# Patient Record
Sex: MALE | Race: WHITE | URBAN - METROPOLITAN AREA
[De-identification: names, ages, dates, MRNs, and addresses within clinical notes are randomized per-mention and may not be internally consistent; named-entity substitution may affect disease eponyms.]

---

## 2018-06-19 PROBLEM — 38341003 HYPERTENSION: Status: ACTIVE | Noted: 2018-06-19

## 2018-06-19 PROBLEM — 161701005 HISTORY OF RESPIRATOR DEPENDENCE: Status: ACTIVE | Noted: 2018-06-19

## 2018-06-19 PROBLEM — 22298006 MYOCARDIAL INFARCTION: Status: ACTIVE | Noted: 2018-06-19

## 2018-06-19 PROBLEM — 13645005 CHRONIC OBSTRUCTIVE PULMONARY DISEASE: Status: ACTIVE | Noted: 2018-06-19

## 2018-06-19 PROBLEM — 398050005 DIVERTICULAR DISEASE OF COLON: Status: ACTIVE | Noted: 2018-06-19

## 2018-06-19 PROBLEM — 13644009 HYPERCHOLESTEROLEMIA: Status: ACTIVE | Noted: 2018-06-19

## 2018-06-19 PROBLEM — 73430006 SLEEP APNEA: Status: ACTIVE | Noted: 2018-06-19

## 2018-06-19 PROBLEM — 42343007 CONGESTIVE HEART FAILURE: Status: ACTIVE | Noted: 2018-06-19

## 2021-08-28 ENCOUNTER — TELEPHONE ENCOUNTER (OUTPATIENT)
Dept: URBAN - METROPOLITAN AREA CLINIC 13 | Facility: CLINIC | Age: 76
End: 2021-08-28

## 2021-08-29 ENCOUNTER — TELEPHONE ENCOUNTER (OUTPATIENT)
Dept: URBAN - METROPOLITAN AREA CLINIC 13 | Facility: CLINIC | Age: 76
End: 2021-08-29

## 2021-08-29 RX ORDER — FLUTICASONE PROPIONATE AND SALMETEROL 50; 250 UG/1; UG/1
POWDER RESPIRATORY (INHALATION)
Status: ACTIVE | COMMUNITY

## 2021-08-29 RX ORDER — NITROGLYCERIN 80 MG/1
PATCH TRANSDERMAL
Status: ACTIVE | COMMUNITY

## 2021-08-29 RX ORDER — TAMSULOSIN HYDROCHLORIDE 0.4 MG/1
CAPSULE ORAL
Status: ACTIVE | COMMUNITY

## 2021-08-29 RX ORDER — FENOFIBRATE 150 MG/1
CAPSULE ORAL
Status: ACTIVE | COMMUNITY

## 2021-08-29 RX ORDER — ATORVASTATIN CALCIUM 10 MG/1
TABLET ORAL
Status: ACTIVE | COMMUNITY

## 2021-08-29 RX ORDER — CLONIDINE HYDROCHLORIDE 0.3 MG/1
TABLET ORAL
Status: ACTIVE | COMMUNITY

## 2021-08-29 RX ORDER — DABIGATRAN ETEXILATE MESYLATE 150 MG/1
CAPSULE ORAL
Status: ACTIVE | COMMUNITY

## 2021-08-29 RX ORDER — OMEGA-3-ACID ETHYL ESTERS 1 G/1
CAPSULE, LIQUID FILLED ORAL
Status: ACTIVE | COMMUNITY

## 2021-08-31 ENCOUNTER — OFFICE VISIT (OUTPATIENT)
Dept: URBAN - METROPOLITAN AREA TELEHEALTH 2 | Facility: TELEHEALTH | Age: 76
End: 2021-08-31
Payer: MEDICARE

## 2021-08-31 ENCOUNTER — OFFICE VISIT (OUTPATIENT)
Dept: URBAN - METROPOLITAN AREA CLINIC 44 | Facility: CLINIC | Age: 76
End: 2021-08-31

## 2021-08-31 ENCOUNTER — LAB OUTSIDE AN ENCOUNTER (OUTPATIENT)
Dept: URBAN - METROPOLITAN AREA TELEHEALTH 2 | Facility: TELEHEALTH | Age: 76
End: 2021-08-31

## 2021-08-31 VITALS — WEIGHT: 260 LBS | HEIGHT: 69 IN | BODY MASS INDEX: 38.51 KG/M2

## 2021-08-31 DIAGNOSIS — Z86.010 PERSONAL HISTORY OF COLONIC POLYPS: ICD-10-CM

## 2021-08-31 DIAGNOSIS — D50.8 ACQUIRED IRON DEFICIENCY ANEMIA DUE TO DECREASED ABSORPTION: ICD-10-CM

## 2021-08-31 PROBLEM — 428283002: Status: ACTIVE | Noted: 2021-08-31

## 2021-08-31 PROBLEM — 428283002 HISTORY OF POLYP OF COLON: Status: ACTIVE | Noted: 2018-06-19

## 2021-08-31 PROCEDURE — 99443 PHONE E/M BY PHYS 21-30 MIN: CPT | Performed by: NURSE PRACTITIONER

## 2021-08-31 RX ORDER — CLONIDINE HYDROCHLORIDE 0.3 MG/1
TABLET ORAL
Status: ACTIVE | COMMUNITY

## 2021-08-31 RX ORDER — DABIGATRAN ETEXILATE MESYLATE 150 MG/1
CAPSULE ORAL
Status: ACTIVE | COMMUNITY

## 2021-08-31 RX ORDER — ATORVASTATIN CALCIUM 10 MG/1
TABLET ORAL
Status: ACTIVE | COMMUNITY

## 2021-08-31 RX ORDER — NITROGLYCERIN 80 MG/1
PATCH TRANSDERMAL
Status: ACTIVE | COMMUNITY

## 2021-08-31 RX ORDER — FLUTICASONE PROPIONATE AND SALMETEROL 50; 250 UG/1; UG/1
POWDER RESPIRATORY (INHALATION)
Status: ON HOLD | COMMUNITY

## 2021-08-31 RX ORDER — FENOFIBRATE 150 MG/1
CAPSULE ORAL
Status: ACTIVE | COMMUNITY

## 2021-08-31 RX ORDER — TAMSULOSIN HYDROCHLORIDE 0.4 MG/1
CAPSULE ORAL
Status: ACTIVE | COMMUNITY

## 2021-08-31 RX ORDER — OMEGA-3-ACID ETHYL ESTERS 1 G/1
CAPSULE, LIQUID FILLED ORAL
Status: ACTIVE | COMMUNITY

## 2021-08-31 NOTE — HPI-ZZZ
76 year old male presents for evaluation of anemia and colon survelliance with a history of tubular adenoma in 8/2018. In July 2021, he went to PCP for shortness of breath and fatigue. Hgbto be 7.1-->8.3 after starting oral iron BID. On 8/10/21 Hgb was 12.1.  Denies blood in stool or black stools. Bowel movements are every few days of formed stool. His weight has been steady. The patient denies upper GI symptoms or any abdominal pain. Denies NSAID. Currenntly, he is on Pradaxa for atrial fibrillatiion. PCP manages his Pradaxa. The patient has never had an EGD. His father had colon cancer. In 2011, he had femoral bypass s/p surgery. Twice he had problems waking up after anesthesia with Propafol. He had three colonoscopies without aneshesia.

## 2021-10-06 ENCOUNTER — LAB OUTSIDE AN ENCOUNTER (OUTPATIENT)
Dept: URBAN - METROPOLITAN AREA CLINIC 46 | Facility: CLINIC | Age: 76
End: 2021-10-06

## 2021-10-06 ENCOUNTER — OFFICE VISIT (OUTPATIENT)
Dept: URBAN - METROPOLITAN AREA MEDICAL CENTER 35 | Facility: MEDICAL CENTER | Age: 76
End: 2021-10-06
Payer: MEDICARE

## 2021-10-06 ENCOUNTER — TELEPHONE ENCOUNTER (OUTPATIENT)
Dept: URBAN - METROPOLITAN AREA CLINIC 46 | Facility: CLINIC | Age: 76
End: 2021-10-06

## 2021-10-06 DIAGNOSIS — D12.3 ADENOMA OF TRANSVERSE COLON: ICD-10-CM

## 2021-10-06 DIAGNOSIS — D12.4 ADENOMA OF DESCENDING COLON: ICD-10-CM

## 2021-10-06 DIAGNOSIS — D50.9 ANEMIA: ICD-10-CM

## 2021-10-06 PROBLEM — 724556004: Status: ACTIVE | Noted: 2021-08-31

## 2021-10-06 PROCEDURE — 45385 COLONOSCOPY W/LESION REMOVAL: CPT | Performed by: INTERNAL MEDICINE

## 2021-10-09 ENCOUNTER — OUT OF OFFICE VISIT (OUTPATIENT)
Dept: URBAN - METROPOLITAN AREA MEDICAL CENTER 35 | Facility: MEDICAL CENTER | Age: 76
End: 2021-10-09
Payer: MEDICARE

## 2021-10-09 DIAGNOSIS — K91.840 COLONOSCOPY CAUSING POST-PROCEDURAL BLEEDING: ICD-10-CM

## 2021-10-09 DIAGNOSIS — K92.2 ACUTE GASTROINTESTINAL BLEEDING: ICD-10-CM

## 2021-10-09 DIAGNOSIS — K63.89 BACTERIAL OVERGROWTH SYNDROME: ICD-10-CM

## 2021-10-09 PROCEDURE — 99223 1ST HOSP IP/OBS HIGH 75: CPT | Performed by: INTERNAL MEDICINE

## 2021-10-09 PROCEDURE — 99285 EMERGENCY DEPT VISIT HI MDM: CPT | Performed by: INTERNAL MEDICINE

## 2021-10-09 PROCEDURE — 45382 COLONOSCOPY W/CONTROL BLEED: CPT | Performed by: INTERNAL MEDICINE

## 2021-10-11 ENCOUNTER — TELEPHONE ENCOUNTER (OUTPATIENT)
Dept: URBAN - METROPOLITAN AREA CLINIC 46 | Facility: CLINIC | Age: 76
End: 2021-10-11

## 2021-10-13 ENCOUNTER — TELEPHONE ENCOUNTER (OUTPATIENT)
Dept: URBAN - METROPOLITAN AREA CLINIC 46 | Facility: CLINIC | Age: 76
End: 2021-10-13

## 2023-11-02 ENCOUNTER — TELEPHONE ENCOUNTER (OUTPATIENT)
Dept: URBAN - METROPOLITAN AREA CLINIC 97 | Facility: CLINIC | Age: 78
End: 2023-11-02

## 2023-11-02 ENCOUNTER — TELEPHONE ENCOUNTER (OUTPATIENT)
Dept: URBAN - METROPOLITAN AREA CLINIC 98 | Facility: CLINIC | Age: 78
End: 2023-11-02

## 2023-11-10 ENCOUNTER — OFFICE VISIT (OUTPATIENT)
Dept: URBAN - METROPOLITAN AREA CLINIC 98 | Facility: CLINIC | Age: 78
End: 2023-11-10
Payer: MEDICARE

## 2023-11-10 ENCOUNTER — DASHBOARD ENCOUNTERS (OUTPATIENT)
Age: 78
End: 2023-11-10

## 2023-11-10 VITALS
HEART RATE: 58 BPM | TEMPERATURE: 97.4 F | HEIGHT: 69 IN | SYSTOLIC BLOOD PRESSURE: 165 MMHG | DIASTOLIC BLOOD PRESSURE: 79 MMHG

## 2023-11-10 DIAGNOSIS — Z95.0 CARDIAC PACEMAKER: ICD-10-CM

## 2023-11-10 DIAGNOSIS — Z95.2 HISTORY OF AORTIC VALVE REPLACEMENT: ICD-10-CM

## 2023-11-10 DIAGNOSIS — D50.9 IRON DEFICIENCY ANEMIA, UNSPECIFIED IRON DEFICIENCY ANEMIA TYPE: ICD-10-CM

## 2023-11-10 DIAGNOSIS — Q27.30 AVM (ARTERIOVENOUS MALFORMATION): ICD-10-CM

## 2023-11-10 DIAGNOSIS — Z99.81 OXYGEN DEPENDENT: ICD-10-CM

## 2023-11-10 PROBLEM — 441509002: Status: ACTIVE | Noted: 2023-11-10

## 2023-11-10 PROBLEM — 27550009: Status: ACTIVE | Noted: 2023-11-10

## 2023-11-10 PROBLEM — 1231000119100: Status: ACTIVE | Noted: 2023-11-10

## 2023-11-10 PROBLEM — 931000119107: Status: ACTIVE | Noted: 2023-11-10

## 2023-11-10 PROBLEM — 87522002: Status: ACTIVE | Noted: 2023-11-10

## 2023-11-10 PROCEDURE — 99205 OFFICE O/P NEW HI 60 MIN: CPT

## 2023-11-10 RX ORDER — FLUTICASONE PROPIONATE AND SALMETEROL 50; 250 UG/1; UG/1
POWDER RESPIRATORY (INHALATION)
Status: ON HOLD | COMMUNITY

## 2023-11-10 RX ORDER — TAMSULOSIN HYDROCHLORIDE 0.4 MG/1
CAPSULE ORAL
Status: ACTIVE | COMMUNITY

## 2023-11-10 RX ORDER — FENOFIBRATE 150 MG/1
CAPSULE ORAL
Status: ACTIVE | COMMUNITY

## 2023-11-10 NOTE — HPI-TODAY'S VISIT:
Mr. Kemp is a 78 year old male new patient here to discuss anemia and possible GI bleed. Patients spouse is here for office visit Referred by Dr. Dumont hematologist; report will be sent following visit. - Dr. Dumont- hemotologist  - Cardiologist Dr. Frankie Cao  - Interventional cardiologist Dr. Frank Yuan  PMH:   - 2/23- Admitted to hospital anemia and heart issues possible MI - Dx. GI bleed - EGD 5/23 - AVMs noted cauterized;  ([facility])--- report not available - Colonoscopy 5/23- AVMs throughout with cauterization---report not available - Pill cam 2/23 normal per pt. - Was on pradaxa and ASA for years until 5/23 STOPPED  - TVAR- bovine aortic valve replacement, pacemaker placed 5/23 - After TVAR and pacemaker started on Eliquis and ASA  - Stopped Eliquis at the end of 9/23 - Stopped ASA 5 days ago 11/7  - labs 11/8: hgb 7.5, mcv 105, plt 167 - lab 11/7: Na 138, K+ 3.7, Cr 1.0, albumin 3.6, ast 26, alk phos 68, Tbil 1.9, alt 14 - 11/9 received 2 units prbc  -  iron infusion 11/6 - No bright red blood, mucus in stool - Stools are dark brown but not black and tarry - No abdominal pain - BM daily with no constipation or diarrhea - Denies nausea, vomiting, reflux, heartburn - No syncope, fatigue, lightheadedness - Next lab draw 11/14 and appointment with Dr. Dumont I have personally spent 74 minutes total time today in preparation, patient care, and documentation for this visit, including the following: assessing the patient, reviewing labratory results from patients ichart (on phone), review of medical test, medical procedures and medical services.

## 2024-01-19 ENCOUNTER — OFFICE VISIT (OUTPATIENT)
Dept: URBAN - METROPOLITAN AREA CLINIC 98 | Facility: CLINIC | Age: 79
End: 2024-01-19

## 2025-07-23 NOTE — PHYSICAL EXAM SKIN:
no rashes , no suspicious lesions , no areas of discoloration
Additional Note: Reassured benign in nature.
Detail Level: Zone
Hide Additional Notes?: No